# Patient Record
Sex: MALE | Race: WHITE | NOT HISPANIC OR LATINO | Employment: FULL TIME | URBAN - NONMETROPOLITAN AREA
[De-identification: names, ages, dates, MRNs, and addresses within clinical notes are randomized per-mention and may not be internally consistent; named-entity substitution may affect disease eponyms.]

---

## 2019-11-21 ENCOUNTER — OFFICE VISIT (OUTPATIENT)
Dept: OCCUPATIONAL MEDICINE | Age: 59
End: 2019-11-21

## 2019-11-21 DIAGNOSIS — Z00.8 HEALTH EXAMINATION IN POPULATION SURVEY: ICD-10-CM

## 2019-11-21 PROCEDURE — OH035 DOT/N-DOT DS COLLECTION ONLY (ALL TYPES): Performed by: PHYSICIAN ASSISTANT

## 2019-11-21 PROCEDURE — OH046 BREATH ALCOHOL TEST BUNDLED: Performed by: PHYSICIAN ASSISTANT

## 2020-01-22 ENCOUNTER — OFFICE VISIT (OUTPATIENT)
Dept: OCCUPATIONAL MEDICINE | Age: 60
End: 2020-01-22

## 2020-01-22 DIAGNOSIS — Z00.8 HEALTH EXAMINATION IN POPULATION SURVEY: ICD-10-CM

## 2020-01-22 PROCEDURE — OH035 DOT/N-DOT DS COLLECTION ONLY (ALL TYPES): Performed by: PHYSICIAN ASSISTANT

## 2022-01-13 ENCOUNTER — TELEPHONE (OUTPATIENT)
Dept: INTERNAL MEDICINE CLINIC | Age: 62
End: 2022-01-13

## 2022-01-13 RX ORDER — SODIUM POLYSTYRENE SULFONATE 15 G/60ML
15 SUSPENSION ORAL; RECTAL ONCE
Qty: 60 ML | Refills: 0 | Status: SHIPPED | OUTPATIENT
Start: 2022-01-13 | End: 2022-01-13

## 2022-01-13 NOTE — TELEPHONE ENCOUNTER
Received lab from [facility]   patient potassium is 5.5, BUN 18 creatinine 1.29, CBC was normal, calcium 10.6 UA normal.  Patient states he is going to have some orthopedic surgery  Will give Kayexalate 15 g p.o. x1  Recheck potassium in 1 week before the surgery.   Will inform Dr. Sree Jean-Baptiste